# Patient Record
Sex: MALE | Race: WHITE | Employment: FULL TIME | URBAN - NONMETROPOLITAN AREA
[De-identification: names, ages, dates, MRNs, and addresses within clinical notes are randomized per-mention and may not be internally consistent; named-entity substitution may affect disease eponyms.]

---

## 2018-02-23 ENCOUNTER — INITIAL CONSULT (OUTPATIENT)
Dept: PULMONOLOGY | Age: 28
End: 2018-02-23
Payer: COMMERCIAL

## 2018-02-23 VITALS
OXYGEN SATURATION: 98 % | DIASTOLIC BLOOD PRESSURE: 82 MMHG | RESPIRATION RATE: 18 BRPM | SYSTOLIC BLOOD PRESSURE: 124 MMHG | HEIGHT: 69 IN | WEIGHT: 278 LBS | BODY MASS INDEX: 41.18 KG/M2 | HEART RATE: 88 BPM

## 2018-02-23 DIAGNOSIS — Z99.89 OSA ON CPAP: Primary | ICD-10-CM

## 2018-02-23 DIAGNOSIS — G47.33 OSA ON CPAP: Primary | ICD-10-CM

## 2018-02-23 PROBLEM — G47.30 SLEEP APNEA: Status: ACTIVE | Noted: 2018-02-23

## 2018-02-23 PROCEDURE — G8484 FLU IMMUNIZE NO ADMIN: HCPCS | Performed by: INTERNAL MEDICINE

## 2018-02-23 PROCEDURE — 99203 OFFICE O/P NEW LOW 30 MIN: CPT | Performed by: INTERNAL MEDICINE

## 2018-02-23 PROCEDURE — 1036F TOBACCO NON-USER: CPT | Performed by: INTERNAL MEDICINE

## 2018-02-23 PROCEDURE — G8417 CALC BMI ABV UP PARAM F/U: HCPCS | Performed by: INTERNAL MEDICINE

## 2018-02-23 PROCEDURE — G8427 DOCREV CUR MEDS BY ELIG CLIN: HCPCS | Performed by: INTERNAL MEDICINE

## 2018-02-23 NOTE — PROGRESS NOTES
Chief Complaint: Sleep Apnea-  Mallampati airway Class:{Jae # [id number]}  Neck Circumference: Inches    [place] sleepiness score 2/23/18:
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
## 2018-03-26 ENCOUNTER — TELEPHONE (OUTPATIENT)
Dept: PULMONOLOGY | Age: 28
End: 2018-03-26

## 2018-03-26 DIAGNOSIS — Z99.89 OSA ON CPAP: Primary | ICD-10-CM

## 2018-03-26 DIAGNOSIS — G47.33 OSA ON CPAP: Primary | ICD-10-CM

## 2018-03-26 NOTE — TELEPHONE ENCOUNTER
Patient's wife called checking on patient's records from [place]. Records have been received. Wife is now wondering if they can get an order for a new cpap machine as his is still not functioning properly. If so they would like it sent to [facility].

## 2018-04-11 ENCOUNTER — TELEPHONE (OUTPATIENT)
Dept: PULMONOLOGY | Age: 28
End: 2018-04-11

## 2018-04-11 DIAGNOSIS — G47.30 SLEEP APNEA, UNSPECIFIED TYPE: Primary | ICD-10-CM

## 2018-04-30 ENCOUNTER — HOSPITAL ENCOUNTER (OUTPATIENT)
Dept: SLEEP CENTER | Age: 28
Discharge: HOME | End: 2018-05-02
Payer: COMMERCIAL

## 2018-04-30 DIAGNOSIS — G47.30 SLEEP APNEA, UNSPECIFIED TYPE: ICD-10-CM

## 2018-04-30 PROCEDURE — 95811 POLYSOM 6/>YRS CPAP 4/> PARM: CPT

## 2018-05-01 DIAGNOSIS — G47.33 OSA ON CPAP: Primary | ICD-10-CM

## 2018-05-01 DIAGNOSIS — Z99.89 OSA ON CPAP: Primary | ICD-10-CM

## 2018-05-01 LAB — STATUS: NORMAL

## 2018-05-02 ENCOUNTER — TELEPHONE (OUTPATIENT)
Dept: SLEEP CENTER | Age: 28
End: 2018-05-02

## 2018-08-01 ENCOUNTER — OFFICE VISIT (OUTPATIENT)
Dept: PULMONOLOGY | Age: 28
End: 2018-08-01
Payer: COMMERCIAL

## 2018-08-01 VITALS
BODY MASS INDEX: 37.23 KG/M2 | HEIGHT: 69 IN | OXYGEN SATURATION: 97 % | WEIGHT: 251.4 LBS | HEART RATE: 75 BPM | RESPIRATION RATE: 14 BRPM | SYSTOLIC BLOOD PRESSURE: 104 MMHG | DIASTOLIC BLOOD PRESSURE: 78 MMHG

## 2018-08-01 DIAGNOSIS — Z99.89 OSA ON CPAP: Primary | ICD-10-CM

## 2018-08-01 DIAGNOSIS — G47.33 OSA ON CPAP: Primary | ICD-10-CM

## 2018-08-01 PROCEDURE — 99213 OFFICE O/P EST LOW 20 MIN: CPT | Performed by: INTERNAL MEDICINE

## 2018-08-01 PROCEDURE — 1036F TOBACCO NON-USER: CPT | Performed by: INTERNAL MEDICINE

## 2018-08-01 PROCEDURE — G8417 CALC BMI ABV UP PARAM F/U: HCPCS | Performed by: INTERNAL MEDICINE

## 2018-08-01 PROCEDURE — G8428 CUR MEDS NOT DOCUMENT: HCPCS | Performed by: INTERNAL MEDICINE

## 2018-08-01 NOTE — PROGRESS NOTES
Sleep Medicine clinic  Follow up for Sleep Apnea    Lizandro Cobian                                                Chief Complaint:  Claudia Yung is here for a 6-8 week follow up for Obstructive sleep apnea with a download. [place]: Lizandro Cobian is a 33 y.o.oldmale came for follow up regarding his sleep apnea. He is currently using his positive airway pressure device with a CPAP pressure of 13cm H20. He denies any problems with machine or mask. He is using a full face mask. He is sleeping well at night with out difficulty. He denies any daytime sleepiness. He is currently working as a  at [employer]. Review of Systems   General/Constitutional: he lost 27 lbs of weight from the last visit with normal appetite. No fever or chills. HENT: Negative. Eyes: Negative. Upper respiratory tract: No nasal stuffiness or post nasal drip. Lower respiratory tract/ lungs: No cough or sputum production. No hemoptysis. Cardiovascular: No palpitations or chest pain. Gastrointestinal: No nausea or vomiting. Neurological: No focal neurologiacal weakness. Extremities: No edema. Musculoskeletal: No complaints. Genitourinary: No complaints. Hematological: Negative. Psychiatric/Behavioral: Negative. Skin: No itching. Past Medical History:   Diagnosis Date    Sleep apnea        Past Surgical History:   Procedure Laterality Date    NASAL SEPTUM SURGERY  2012       Social History   Substance Use Topics    Smoking status: Former Smoker     Types: Cigars    Smokeless tobacco: Former User     Types: Snuff, Chew     Quit date: 2013      Comment: will smoke 1-2 cigars a yr.      Alcohol use Yes      Comment: rarely       Allergies   Allergen Reactions    Ceclor [Cefaclor]        Current Outpatient Prescriptions   Medication Sig Dispense Refill    NONFORMULARY All natural allergy medicine      CPAP Machine MISC by Does not apply route -Will change his current auto CPAP settings to minimum of results and clinical improvement.  -Follow with my clinic in 16months for clinical reevaluation with review of download. -He was advised to call [employer] company regarding supplies if needed. -He was advised to call my office for earlier appointment if needed for worsening of sleep symptoms.  -He was advised to loose weight by controlling diet and doing exercise once cleared by his family physician.   -Joanne Villanueva was educated about my impression and plan. Patient verbalizes understanding.

## 2018-08-01 NOTE — PROGRESS NOTES
Chief Complaint:  Severo Chin is here for a 6-8 week follow up for Obstructive sleep apnea with a download. Mallampati airway Class:  IV  Neck Circumference:  17.5 Inches    [place] sleepiness score 8/1/18:  4. Diagnostic Data: 4-30-18   AHI 66.7  PAP Download:    Recorded compliance dates:  6-20-18 -  7-19-18  More than 4hour usage compliance was:  100%.   Average residual Apnea- Hypoapnea index on current pressue was:0.6       PAP Type C PAP   Level  13 cmH20     Average usuage hours per day was: 6 hrs 58 min    Interface:  FFM    Provider:  [x][facility]  [][facility]  [][facility]  [][facility]         [][facility] []Other:

## 2019-05-16 ENCOUNTER — TELEPHONE (OUTPATIENT)
Dept: PULMONOLOGY | Age: 29
End: 2019-05-16

## 2019-05-16 DIAGNOSIS — G47.33 OSA ON CPAP: Primary | ICD-10-CM

## 2019-05-16 DIAGNOSIS — Z99.89 OSA ON CPAP: Primary | ICD-10-CM

## 2019-05-16 NOTE — TELEPHONE ENCOUNTER
Pressure change order has been given to [facility] here in the office.  Patient has been notified of pressure change and has been scheduled to come back in the office 06/05/19 @1pm

## 2019-06-06 ENCOUNTER — OFFICE VISIT (OUTPATIENT)
Dept: PULMONOLOGY | Age: 29
End: 2019-06-06
Payer: COMMERCIAL

## 2019-06-06 VITALS
HEART RATE: 84 BPM | OXYGEN SATURATION: 98 % | HEIGHT: 69 IN | BODY MASS INDEX: 33.44 KG/M2 | WEIGHT: 225.8 LBS | DIASTOLIC BLOOD PRESSURE: 74 MMHG | SYSTOLIC BLOOD PRESSURE: 118 MMHG

## 2019-06-06 DIAGNOSIS — Z99.89 OSA ON CPAP: Primary | ICD-10-CM

## 2019-06-06 DIAGNOSIS — G47.33 OSA ON CPAP: Primary | ICD-10-CM

## 2019-06-06 PROCEDURE — 1036F TOBACCO NON-USER: CPT | Performed by: NURSE PRACTITIONER

## 2019-06-06 PROCEDURE — 99213 OFFICE O/P EST LOW 20 MIN: CPT | Performed by: NURSE PRACTITIONER

## 2019-06-06 PROCEDURE — G8427 DOCREV CUR MEDS BY ELIG CLIN: HCPCS | Performed by: NURSE PRACTITIONER

## 2019-06-06 PROCEDURE — G8417 CALC BMI ABV UP PARAM F/U: HCPCS | Performed by: NURSE PRACTITIONER

## 2019-06-06 NOTE — PROGRESS NOTES
Vanessa Mins         [id number]  6/6/2019   Chief Complaint   Patient presents with    Follow-up     JOHN  2 week follow up with pressure change. Pt of Dr. Shalonda Lopez     PAP Download:   Original or initial AHI: 85.44     Date of initial study: 7/24/12  Weight of initial study: 280 lb Repeat titration 4/30/19 AHI 66.7 Weight 278 Lb   [x] Compliant  100%   []  Noncompliant 0%     PAP Type airsense 10 Level  11 cmh2o    Avg Hrs/Day 6:40  AHI: 1.0   Recorded compliance dates,4/30/19-5/29/19 Machine/Mfg: resmed  Interface: ffm    Provider:  [x][facility]  [][facility]  [][facility]  [][facility]         [][facility] []Other:     Neck Size: 17.5  Mallampati Mallampati 4  ESS:  3  SAQLI 88  Here is a scan of the most recent download:        Presentation:   Onofre Lezama presents for sleep medicine follow up for obstructive sleep apnea  Since the last visit, Onofre Lezama required a decrease in pressure from 13 to 11 cwp. He has lost 60 lbs intentionally. He was developing gas distention at 13. His pressure was changed mid May and he presents today doing markedly better. He does not plan to lose much more weight. Equipment issues: The pressure is  acceptable, the mask is acceptable and he  is  using the humidity. Sleep issues:  Do you feel better? Yes  More rested? Yes   Better concentration? yes    Progress History:   Since last visit any new medical issues? No  New ER or hospitlal visits? No  Any new or changes in medicines? No  Any new sleep medicines? No      Past Medical History:   Diagnosis Date    Sleep apnea        Past Surgical History:   Procedure Laterality Date    NASAL SEPTUM SURGERY  2012       Social History     Tobacco Use    Smoking status: Former Smoker     Types: Cigars    Smokeless tobacco: Former User     Types: Snuff, Chew     Quit date: 2013    Tobacco comment: will smoke 1-2 cigars a yr.     Substance Use Topics    Alcohol use: Yes     Comment: rarely    Drug use: No       Allergies   Allergen Reactions    Ignacia [Cefaclor]        Current Outpatient Medications   Medication Sig Dispense Refill    CPAP Machine MISC by Does not apply route Please decrease his CPAP pressure to 11cm H20. 1 each 0    NONFORMULARY All natural allergy medicine      CPAP Machine MISC by Does not apply route -Will change his current auto CPAP settings to minimum of 10cm H20 and maximum of 16cm H20. 1 each 0     No current facility-administered medications for this visit. No family history on file. Review of Systems   General/Constitutional: Intentional loss of weight over the last year, no appetite changes. No fever or chills. HENT: Negative. Eyes: Negative. Upper respiratory tract: No nasal stuffiness or post nasal drip. Lower respiratory tract/ lungs: No cough or sputum production. No hemoptysis. Cardiovascular: No palpitations or chest pain. Gastrointestinal: No nausea or vomiting. Neurological: No focal neurologiacal weakness. Extremities: No edema. Musculoskeletal: No complaints. Genitourinary: No complaints. Hematological: Negative. Psychiatric/Behavioral: Negative. Skin: No itching. Physical Exam:    BMI:  Body mass index is 33.34 kg/m². Wt Readings from Last 3 Encounters:   06/06/19 225 lb 12.8 oz (102.4 kg)   08/01/18 251 lb 6.4 oz (114 kg)   02/23/18 278 lb (126.1 kg)     Weight lost 53 lbs since sleep study (60 lbs over the last year)  Vitals: /74 (Site: Left Upper Arm, Position: Sitting, Cuff Size: Large Adult)   Pulse 84   Ht 5' 9\" (1.753 m)   Wt 225 lb 12.8 oz (102.4 kg)   SpO2 98% Comment: on RA  BMI 33.34 kg/m²         General Appearance Moderately built, moderately nourished, in no acute distress. HEENT - Head is normocephalic, atraumatic. PERRL. Oral mucosa pink and moist, no oral thrush. Mallampati Score - II (soft palate, uvula, fauces visible). Neck - Supple, symmetrical, trachea midline and soft.   Lungs - Chest symmetric with normal A/P diameter, normal respiratory rate and rhythm, lungs clear to auscultation, no wheezes, rales or rhonchi, aeration good. Cardiovascular - Heart sounds are normal. Regular rhythm normal rate without murmur, gallop or rub. Abdomen - Soft, nontender, non-distended. Neurologic - Alert and oriented x 3. Skin - No bruising or bleeding. Extremities - No cyanosis, clubbing or edema. ASSESSMENT/DIAGNOSIS     Diagnosis Orders   1. JOHN on CPAP              Plan   Do you need any equipment today? No. He has no intention for any further significant weight loss, therefore will continue at 11 cwp. We discussed option for APAP if he does continue to lose. He will call with any change in symptoms.    - He  was advised to continue current positive airway pressure therapy with above described pressure. - He  advised to keep goodcompliance with current recommended pressure to get optimal results and clinical improvement.  - Recommend 7-9 hours of sleep with PAP treatment. - He was advised to call [employer] company regarding supplies if needed.   -He call my office for earlier appointment if needed for worsening of sleep symptoms.   - He was instructed on weight loss. Brett Jensen was educated about my impression and plan. Patient verbalizes understanding. We will see Maurice Bermeo back in: 1 year with download.     Electronically signed by MIK Grewal CNP on 6/6/2019 at 1:32 PM

## 2019-06-20 ENCOUNTER — APPOINTMENT (OUTPATIENT)
Dept: GENERAL RADIOLOGY | Age: 29
End: 2019-06-20
Payer: COMMERCIAL

## 2019-06-20 ENCOUNTER — HOSPITAL ENCOUNTER (EMERGENCY)
Age: 29
Discharge: HOME | End: 2019-06-20
Payer: COMMERCIAL

## 2019-06-20 VITALS
OXYGEN SATURATION: 98 % | TEMPERATURE: 98 F | HEIGHT: 69 IN | HEART RATE: 80 BPM | DIASTOLIC BLOOD PRESSURE: 76 MMHG | SYSTOLIC BLOOD PRESSURE: 121 MMHG | WEIGHT: 220 LBS | BODY MASS INDEX: 32.58 KG/M2 | RESPIRATION RATE: 18 BRPM

## 2019-06-20 DIAGNOSIS — R07.9 CHEST PAIN, UNSPECIFIED TYPE: Primary | ICD-10-CM

## 2019-06-20 LAB
ANION GAP SERPL CALCULATED.3IONS-SCNC: 15 MEQ/L (ref 8–16)
BASOPHILS # BLD: 0.5 %
BASOPHILS ABSOLUTE: 0 THOU/MM3 (ref 0–0.1)
BUN BLDV-MCNC: 10 MG/DL (ref 7–22)
CALCIUM SERPL-MCNC: 10.4 MG/DL (ref 8.5–10.5)
CHLORIDE BLD-SCNC: 98 MEQ/L (ref 98–111)
CO2: 28 MEQ/L (ref 23–33)
CREAT SERPL-MCNC: 0.7 MG/DL (ref 0.4–1.2)
EKG ATRIAL RATE: 83 BPM
EKG P AXIS: 67 DEGREES
EKG P-R INTERVAL: 164 MS
EKG Q-T INTERVAL: 356 MS
EKG QRS DURATION: 94 MS
EKG QTC CALCULATION (BAZETT): 410 MS
EKG R AXIS: 62 DEGREES
EKG T AXIS: 52 DEGREES
EKG VENTRICULAR RATE: 80 BPM
EOSINOPHIL # BLD: 1.3 %
EOSINOPHILS ABSOLUTE: 0.1 THOU/MM3 (ref 0–0.4)
ERYTHROCYTE [DISTWIDTH] IN BLOOD BY AUTOMATED COUNT: 12.3 % (ref 11.5–14.5)
ERYTHROCYTE [DISTWIDTH] IN BLOOD BY AUTOMATED COUNT: 38.7 FL (ref 35–45)
GFR SERPL CREATININE-BSD FRML MDRD: > 90 ML/MIN/1.73M2
GLUCOSE BLD-MCNC: 99 MG/DL (ref 70–108)
HCT VFR BLD CALC: 44.5 % (ref 42–52)
HEMOGLOBIN: 15.3 GM/DL (ref 14–18)
IMMATURE GRANS (ABS): 0.01 THOU/MM3 (ref 0–0.07)
IMMATURE GRANULOCYTES: 0.2 %
LYMPHOCYTES # BLD: 48.1 %
LYMPHOCYTES ABSOLUTE: 2.8 THOU/MM3 (ref 1–4.8)
MAGNESIUM: 2 MG/DL (ref 1.6–2.4)
MCH RBC QN AUTO: 29.6 PG (ref 26–33)
MCHC RBC AUTO-ENTMCNC: 34.4 GM/DL (ref 32.2–35.5)
MCV RBC AUTO: 86.1 FL (ref 80–94)
MONOCYTES # BLD: 9.4 %
MONOCYTES ABSOLUTE: 0.6 THOU/MM3 (ref 0.4–1.3)
NUCLEATED RED BLOOD CELLS: 0 /100 WBC
OSMOLALITY CALCULATION: 280.3 MOSMOL/KG (ref 275–300)
PLATELET # BLD: 257 THOU/MM3 (ref 130–400)
PMV BLD AUTO: 9.1 FL (ref 9.4–12.4)
POTASSIUM SERPL-SCNC: 4 MEQ/L (ref 3.5–5.2)
RBC # BLD: 5.17 MILL/MM3 (ref 4.7–6.1)
SEG NEUTROPHILS: 40.5 %
SEGMENTED NEUTROPHILS ABSOLUTE COUNT: 2.4 THOU/MM3 (ref 1.8–7.7)
SODIUM BLD-SCNC: 141 MEQ/L (ref 135–145)
TROPONIN T: < 0.01 NG/ML
TROPONIN T: < 0.01 NG/ML
WBC # BLD: 5.9 THOU/MM3 (ref 4.8–10.8)

## 2019-06-20 PROCEDURE — 84484 ASSAY OF TROPONIN QUANT: CPT

## 2019-06-20 PROCEDURE — 85025 COMPLETE CBC W/AUTO DIFF WBC: CPT

## 2019-06-20 PROCEDURE — 93005 ELECTROCARDIOGRAM TRACING: CPT | Performed by: PHYSICIAN ASSISTANT

## 2019-06-20 PROCEDURE — 71046 X-RAY EXAM CHEST 2 VIEWS: CPT

## 2019-06-20 PROCEDURE — 99285 EMERGENCY DEPT VISIT HI MDM: CPT

## 2019-06-20 PROCEDURE — 80048 BASIC METABOLIC PNL TOTAL CA: CPT

## 2019-06-20 PROCEDURE — 83735 ASSAY OF MAGNESIUM: CPT

## 2019-06-20 PROCEDURE — 93010 ELECTROCARDIOGRAM REPORT: CPT | Performed by: INTERNAL MEDICINE

## 2019-06-20 PROCEDURE — 36415 COLL VENOUS BLD VENIPUNCTURE: CPT

## 2019-06-20 ASSESSMENT — PAIN DESCRIPTION - PAIN TYPE: TYPE: ACUTE PAIN

## 2019-06-20 ASSESSMENT — PAIN DESCRIPTION - LOCATION: LOCATION: CHEST

## 2019-06-20 ASSESSMENT — ENCOUNTER SYMPTOMS
VOMITING: 0
WHEEZING: 0
NAUSEA: 0
SHORTNESS OF BREATH: 0
EYE REDNESS: 0
BACK PAIN: 0
RHINORRHEA: 0
ABDOMINAL PAIN: 0
COUGH: 0
EYE DISCHARGE: 0
DIARRHEA: 0
SORE THROAT: 0

## 2019-06-20 ASSESSMENT — PAIN SCALES - GENERAL: PAINLEVEL_OUTOF10: 2

## 2019-06-20 ASSESSMENT — PAIN DESCRIPTION - FREQUENCY: FREQUENCY: CONTINUOUS

## 2019-06-20 ASSESSMENT — PAIN DESCRIPTION - DESCRIPTORS: DESCRIPTORS: TIGHTNESS

## 2019-06-20 NOTE — ED NOTES
Patient resting quietly in cot showing no signs of distress at this time. Rates chest tightness 1/10. Updated on POC. Will continue to monitor.       Jonathan Gonzales RN  06/20/19 1725

## 2019-06-20 NOTE — ED PROVIDER NOTES
[facility]  eMERGENCY dEPARTMENT eNCOUnter          CHIEF COMPLAINT       Chief Complaint   Patient presents with    Chest Pain       Nurses Notes reviewed and I agree except as noted in the HPI. HISTORY OF PRESENT ILLNESS    Tony Ragsdale is a 26 y.o. male who presents to the Emergency Department for the evaluation of chest pain which the patient reports became present around 0730 this morning. The patient states, \"It feels like a muscle cramp\". He rates his current pain as a 2/10 in severity and aching in character. No radicular pain is noted. Patient denies any diaphoresis, nausea, vomiting, or shortness of breath associated with his chest pain. Patient admits to have recently been under an increased amount of stress due to his wife experiencing a miscarriage 2 months ago and father's recent relapse on alcohol. He reports that he is currently undergoing counseling due to his stress and denies any suicidal or homicidal ideations. Patient denied consultation with GARRETT in the ED. The patient reports no additional symptoms or complaints at this time. The HPI was provided by the patient. REVIEW OF SYSTEMS     Review of Systems   Constitutional: Negative for appetite change, chills, fatigue and fever. HENT: Negative for congestion, ear pain, rhinorrhea and sore throat. Eyes: Negative for discharge, redness and visual disturbance. Respiratory: Negative for cough, shortness of breath and wheezing. Cardiovascular: Positive for chest pain. Negative for palpitations and leg swelling. Gastrointestinal: Negative for abdominal pain, diarrhea, nausea and vomiting. Genitourinary: Negative for decreased urine volume, difficulty urinating, dysuria and hematuria. Musculoskeletal: Negative for arthralgias, back pain, joint swelling and neck pain. Skin: Negative for pallor and rash. Allergic/Immunologic: Negative for environmental allergies.    Neurological: Negative for dizziness, syncope, weakness, light-headedness, numbness and headaches. Hematological: Negative for adenopathy. Psychiatric/Behavioral: Negative for confusion and suicidal ideas. The patient is nervous/anxious. PAST MEDICAL HISTORY    has a past medical history of Sleep apnea. SURGICAL HISTORY      has a past surgical history that includes Nasal septum surgery (2012) and Nasal sinus surgery. CURRENT MEDICATIONS       Discharge Medication List as of 6/20/2019  3:08 PM      CONTINUE these medications which have NOT CHANGED    Details   ! ! CPAP Machine MISC Starting Thu 5/16/2019, Disp-1 each, R-0, PrintPlease decrease his CPAP pressure to 11cm H20.      NONFORMULARY All natural allergy medicineHistorical Med      !! CPAP Machine MISC Starting Fri 2/23/2018, Disp-1 each, R-0, Print-Will change his current auto CPAP settings to minimum of 10cm H20 and maximum of 16cm H20.       !! - Potential duplicate medications found. Please discuss with provider. ALLERGIES     is allergic to ceclor [cefaclor]. FAMILY HISTORY     has no family status information on file. family history is not on file. SOCIAL HISTORY      reports that he has quit smoking. His smoking use included cigars. He quit smokeless tobacco use about 6 years ago. His smokeless tobacco use included snuff and chew. He reports that he drinks alcohol. He reports that he does not use drugs. PHYSICAL EXAM     INITIAL VITALS:  height is 5' 9\" (1.753 m) and weight is 220 lb (99.8 kg). His oral temperature is 98 °F (36.7 °C). His blood pressure is 121/76 and his pulse is 80. His respiration is 18 and oxygen saturation is 98%. Physical Exam   Constitutional: He is oriented to person, place, and time. He appears well-developed and well-nourished. Non-toxic appearance. HENT:   Head: Normocephalic and atraumatic.    Right Ear: Tympanic membrane and external ear normal.   Left Ear: Tympanic membrane and external ear normal.   Nose: Nose normal. Mouth/Throat: Oropharynx is clear and moist and mucous membranes are normal. No oropharyngeal exudate, posterior oropharyngeal edema or posterior oropharyngeal erythema. Eyes: Conjunctivae and EOM are normal.   Neck: Normal range of motion. Neck supple. No JVD present. Cardiovascular: Normal rate, regular rhythm, normal heart sounds, intact distal pulses and normal pulses. Exam reveals no gallop and no friction rub. No murmur heard. Pulmonary/Chest: Effort normal and breath sounds normal. No respiratory distress. He has no decreased breath sounds. He has no wheezes. He has no rhonchi. He has no rales. Abdominal: Soft. Bowel sounds are normal. He exhibits no distension. There is no tenderness. There is no rebound, no guarding and no CVA tenderness. Musculoskeletal: Normal range of motion. He exhibits no edema. Neurological: He is alert and oriented to person, place, and time. He exhibits normal muscle tone. Coordination normal.   Skin: Skin is warm and dry. No rash noted. He is not diaphoretic. Psychiatric: His mood appears anxious. Nursing note and vitals reviewed. DIFFERENTIALDIAGNOSIS:   Bronchitis, Pneumonia, ACS, PE, Musculoskeletal pain, pleuritic pain    DIAGNOSTIC RESULTS     EKG: All EKG's are interpreted by the Emergency Department Physician who either signs or Co-signs this chart in the absence of a cardiologist.    Maegan Hunter. Rate: 80 bpm  WY interval: 164 ms  QRS duration: 94 ms  QTc: 410 ms  P-R-T axes: 67, 62, 52  No STEMI. EKG gives impression of NSR with sinus arrhythmia    No previous EKG is available for comparison. RADIOLOGY: non-plain film images(s) such as CT, Ultrasound and MRI are read by the radiologist.    XR CHEST STANDARD (2 VW)   Final Result   Normal chest. No acute findings. **This report has been created using voice recognition software. It may contain minor errors which are inherent in voice recognition technology. **      Final report electronically signed by Dr. Festus Frausto on 6/20/2019 11:40 AM          LABS:   Labs Reviewed   CBC WITH AUTO DIFFERENTIAL - Abnormal; Notable for the following components:       Result Value    MPV 9.1 (*)     All other components within normal limits   BASIC METABOLIC PANEL   TROPONIN   MAGNESIUM   ANION GAP   GLOMERULAR FILTRATION RATE, ESTIMATED   OSMOLALITY   TROPONIN       EMERGENCY DEPARTMENT COURSE:   Vitals:    Vitals:    06/20/19 1101 06/20/19 1254 06/20/19 1417 06/20/19 1446   BP: (!) 144/93 111/70 114/65 121/76   Pulse: 78 82 77 80   Resp: 18 18 18 18   Temp: 98 °F (36.7 °C)      TempSrc: Oral      SpO2: 100% 99% 99% 98%   Weight: 220 lb (99.8 kg)      Height: 5' 9\" (1.753 m)          11:19 AM: The patient was seen and evaluated. MDM:  The patient was seen and evaluated within the ED today for the evaluation of chest pain. The patient presented in no acute distress. Physical exam revealed no significant abnormalities or concerns. Repeat Troponins are 0.010. Standard chest XR reveals no acute findings. EKG is normal.  I observed the patient's condition to remain stable during the duration of his stay. He was amenable to my decision for discharge and was sent home in stable condition. I discussed return precautions and he verbalized understanding. I recommended that he f/u with cardiology tomorrow for further evaluation and work up. All questions and concerns were addressed. CRITICAL CARE:   None     CONSULTS:  None    PROCEDURES:  None     FINAL IMPRESSION      1.  Chest pain, unspecified type          DISPOSITION/PLAN   Discharged    PATIENT REFERRED TO:  [facility] [address] [place] [address]  [phone]  In 1 day        DISCHARGE MEDICATIONS:  Discharge Medication List as of 6/20/2019  3:08 PM          (Please note that portions of this note were completed with a voice recognition program.  Efforts were made to edit the dictations butoccasionally words are

## 2019-06-20 NOTE — ED NOTES
Patient resting quietly in cot showing no signs of distress at this time. Rates chest tightness 1/10. Updated on POC. Will continue to monitor.       Chante Moise RN  06/20/19 0230

## 2019-09-17 ENCOUNTER — OFFICE VISIT (OUTPATIENT)
Dept: PSYCHOLOGY | Age: 29
End: 2019-09-17
Payer: COMMERCIAL

## 2019-09-17 DIAGNOSIS — F42.2 MIXED OBSESSIONAL THOUGHTS AND ACTS: Primary | ICD-10-CM

## 2019-09-17 PROCEDURE — 90791 PSYCH DIAGNOSTIC EVALUATION: CPT | Performed by: PSYCHOLOGIST

## 2019-09-17 SDOH — SOCIAL STABILITY: SOCIAL INSECURITY: WITHIN THE LAST YEAR, HAVE YOU BEEN HUMILIATED OR EMOTIONALLY ABUSED IN OTHER WAYS BY YOUR PARTNER OR EX-PARTNER?: NO

## 2019-09-17 SDOH — ECONOMIC STABILITY: INCOME INSECURITY: HOW HARD IS IT FOR YOU TO PAY FOR THE VERY BASICS LIKE FOOD, HOUSING, MEDICAL CARE, AND HEATING?: NOT HARD AT ALL

## 2019-09-17 SDOH — SOCIAL STABILITY: SOCIAL INSECURITY: WITHIN THE LAST YEAR, HAVE YOU BEEN AFRAID OF YOUR PARTNER OR EX-PARTNER?: NO

## 2019-09-17 SDOH — HEALTH STABILITY: MENTAL HEALTH: HOW MANY STANDARD DRINKS CONTAINING ALCOHOL DO YOU HAVE ON A TYPICAL DAY?: 1 OR 2

## 2019-09-17 SDOH — SOCIAL STABILITY: SOCIAL NETWORK: ARE YOU MARRIED, WIDOWED, DIVORCED, SEPARATED, NEVER MARRIED, OR LIVING WITH A PARTNER?: MARRIED

## 2019-09-17 SDOH — SOCIAL STABILITY: SOCIAL NETWORK
IN A TYPICAL WEEK, HOW MANY TIMES DO YOU TALK ON THE PHONE WITH FAMILY, FRIENDS, OR NEIGHBORS?: MORE THAN THREE TIMES A WEEK

## 2019-09-17 SDOH — SOCIAL STABILITY: SOCIAL NETWORK: HOW OFTEN DO YOU ATTENT MEETINGS OF THE CLUB OR ORGANIZATION YOU BELONG TO?: MORE THAN 4 TIMES PER YEAR

## 2019-09-17 SDOH — ECONOMIC STABILITY: TRANSPORTATION INSECURITY
IN THE PAST 12 MONTHS, HAS THE LACK OF TRANSPORTATION KEPT YOU FROM MEDICAL APPOINTMENTS OR FROM GETTING MEDICATIONS?: NO

## 2019-09-17 SDOH — ECONOMIC STABILITY: TRANSPORTATION INSECURITY
IN THE PAST 12 MONTHS, HAS LACK OF TRANSPORTATION KEPT YOU FROM MEETINGS, WORK, OR FROM GETTING THINGS NEEDED FOR DAILY LIVING?: NO

## 2019-09-17 SDOH — HEALTH STABILITY: PHYSICAL HEALTH: ON AVERAGE, HOW MANY DAYS PER WEEK DO YOU ENGAGE IN MODERATE TO STRENUOUS EXERCISE (LIKE A BRISK WALK)?: 2 DAYS

## 2019-09-17 SDOH — HEALTH STABILITY: PHYSICAL HEALTH: ON AVERAGE, HOW MANY MINUTES DO YOU ENGAGE IN EXERCISE AT THIS LEVEL?: 30 MIN

## 2019-09-17 SDOH — HEALTH STABILITY: MENTAL HEALTH: HOW OFTEN DO YOU HAVE A DRINK CONTAINING ALCOHOL?: MONTHLY OR LESS

## 2019-09-17 SDOH — SOCIAL STABILITY: SOCIAL INSECURITY
WITHIN THE LAST YEAR, HAVE TO BEEN RAPED OR FORCED TO HAVE ANY KIND OF SEXUAL ACTIVITY BY YOUR PARTNER OR EX-PARTNER?: NO

## 2019-09-17 SDOH — SOCIAL STABILITY: SOCIAL NETWORK
DO YOU BELONG TO ANY CLUBS OR ORGANIZATIONS SUCH AS CHURCH GROUPS UNIONS, FRATERNAL OR ATHLETIC GROUPS, OR SCHOOL GROUPS?: YES

## 2019-09-17 SDOH — SOCIAL STABILITY: SOCIAL INSECURITY
WITHIN THE LAST YEAR, HAVE YOU BEEN KICKED, HIT, SLAPPED, OR OTHERWISE PHYSICALLY HURT BY YOUR PARTNER OR EX-PARTNER?: NO

## 2019-09-17 SDOH — SOCIAL STABILITY: SOCIAL NETWORK: HOW OFTEN DO YOU ATTEND CHURCH OR RELIGIOUS SERVICES?: MORE THAN 4 TIMES PER YEAR

## 2019-09-24 ENCOUNTER — OFFICE VISIT (OUTPATIENT)
Dept: PSYCHOLOGY | Age: 29
End: 2019-09-24
Payer: COMMERCIAL

## 2019-09-24 DIAGNOSIS — F42.2 MIXED OBSESSIONAL THOUGHTS AND ACTS: Primary | ICD-10-CM

## 2019-09-24 PROCEDURE — 90837 PSYTX W PT 60 MINUTES: CPT | Performed by: PSYCHOLOGIST

## 2019-09-24 NOTE — PROGRESS NOTES
Behavioral Health Consultation  Juliette Maciel, PhD  Psychologist  9/24/2019  9:24 AM      Time spent with Patient:  60 minutes  This is patient's second  [facility] appointment. Reason for Consult:  anxiety  Referring Provider: No referring provider defined for this encounter. Pt provided informed consent for the behavioral health program. Discussed with patient model of service to include the limits of confidentiality (i.e. abuse reporting, suicide intervention, etc.) and short-term intervention focused approach. Pt indicated understanding. Feedback given to PCP. Description:    MSE:    Appearance    crying  Appetite normal  Sleep disturbance Yes  Loss of pleasure No  Speech    normal rate  Mood    Anxious  Affect    anxiety  Thought Content    intact  Insight    Fair  Judgment    Intact  Suicide Assessment    no suicidal ideation  Homicidal Assessment Intent No  Cutting No  Enuresis No  Learning Disorder none      History:    Medications:   Current Outpatient Medications   Medication Sig Dispense Refill    CPAP Machine MISC by Does not apply route Please decrease his CPAP pressure to 11cm H20. 1 each 0    NONFORMULARY All natural allergy medicine      CPAP Machine MISC by Does not apply route -Will change his current auto CPAP settings to minimum of 10cm H20 and maximum of 16cm H20. 1 each 0     No current facility-administered medications for this visit.         Social History:   Social History     Socioeconomic History    Marital status:      Spouse name: Not on file    Number of children: Not on file    Years of education: post high school    Highest education level: Not on file   Occupational History    Occupation:    Social Needs    Financial resource strain: Not hard at all   Deja-Azam insecurity:     Worry: Not on file     Inability: Not on file    Transportation needs:     Medical: No     Non-medical: No   Tobacco Use    Smoking status: Former Smoker     Types: Cigars    Smokeless tobacco: Former User     Types: Snuff, Tyshawn Dears     Quit date: 2013    Tobacco comment: will smoke 1-2 cigars a yr. Substance and Sexual Activity    Alcohol use: Yes     Frequency: Monthly or less     Drinks per session: 1 or 2     Binge frequency: Never     Comment: rarely    Drug use: Not on file    Sexual activity: Yes     Partners: Female   Lifestyle    Physical activity:     Days per week: 2 days     Minutes per session: 30 min    Stress: Not on file   Relationships    Social connections:     Talks on phone: More than three times a week     Gets together: Not on file     Attends Zoroastrianism service: More than 4 times per year     Active member of club or organization: Yes     Attends meetings of clubs or organizations: More than 4 times per year     Relationship status:     Intimate partner violence:     Fear of current or ex partner: No     Emotionally abused: No     Physically abused: No     Forced sexual activity: No   Other Topics Concern    Not on file   Social History Narrative    Has friends from the [facility]; moved here from [place], moved during month of September, 2019 to a new home in [facility] [address]:   reports that he has quit smoking. His smoking use included cigars. He quit smokeless tobacco use about 6 years ago. His smokeless tobacco use included snuff and chew. ETOH:   reports that he drinks alcohol. Family History:   No family history on file. Assessment:  He is having issues with his sleep and not getting enough. He is having problems with eating healthy and has started a new diet. His wants to improve his exercise. He is feeling shame and guilt and feels like he needs to control everything at home. He started to cry when talking about being lonely and not having a friend. He is a  and feels like he has no one to talk to. He is feeling guilty about his relationship with his wife. They are under a lot of stress. Diagnosis:    . Eddie Cardoso

## 2019-10-01 ENCOUNTER — OFFICE VISIT (OUTPATIENT)
Dept: PSYCHOLOGY | Age: 29
End: 2019-10-01
Payer: COMMERCIAL

## 2019-10-01 DIAGNOSIS — F42.2 MIXED OBSESSIONAL THOUGHTS AND ACTS: Primary | ICD-10-CM

## 2019-10-01 PROCEDURE — 90837 PSYTX W PT 60 MINUTES: CPT | Performed by: PSYCHOLOGIST

## 2019-10-08 ENCOUNTER — OFFICE VISIT (OUTPATIENT)
Dept: PSYCHOLOGY | Age: 29
End: 2019-10-08
Payer: COMMERCIAL

## 2019-10-08 DIAGNOSIS — F42.2 MIXED OBSESSIONAL THOUGHTS AND ACTS: ICD-10-CM

## 2019-10-08 DIAGNOSIS — F41.9 ANXIETY: Primary | ICD-10-CM

## 2019-10-08 PROCEDURE — 90837 PSYTX W PT 60 MINUTES: CPT | Performed by: PSYCHOLOGIST

## 2019-10-14 ENCOUNTER — OFFICE VISIT (OUTPATIENT)
Dept: PSYCHOLOGY | Age: 29
End: 2019-10-14
Payer: COMMERCIAL

## 2019-10-14 DIAGNOSIS — F42.2 MIXED OBSESSIONAL THOUGHTS AND ACTS: Primary | ICD-10-CM

## 2019-10-14 PROCEDURE — 90837 PSYTX W PT 60 MINUTES: CPT | Performed by: PSYCHOLOGIST

## 2019-10-22 ENCOUNTER — OFFICE VISIT (OUTPATIENT)
Dept: PSYCHOLOGY | Age: 29
End: 2019-10-22
Payer: COMMERCIAL

## 2019-10-22 DIAGNOSIS — F42.2 MIXED OBSESSIONAL THOUGHTS AND ACTS: Primary | ICD-10-CM

## 2019-10-22 PROCEDURE — 90837 PSYTX W PT 60 MINUTES: CPT | Performed by: PSYCHOLOGIST

## 2019-10-29 ENCOUNTER — OFFICE VISIT (OUTPATIENT)
Dept: PSYCHOLOGY | Age: 29
End: 2019-10-29
Payer: COMMERCIAL

## 2019-10-29 DIAGNOSIS — F42.2 MIXED OBSESSIONAL THOUGHTS AND ACTS: Primary | ICD-10-CM

## 2019-10-29 PROCEDURE — 90837 PSYTX W PT 60 MINUTES: CPT | Performed by: PSYCHOLOGIST

## 2019-11-05 ENCOUNTER — OFFICE VISIT (OUTPATIENT)
Dept: PSYCHOLOGY | Age: 29
End: 2019-11-05
Payer: COMMERCIAL

## 2019-11-05 DIAGNOSIS — F42.2 MIXED OBSESSIONAL THOUGHTS AND ACTS: Primary | ICD-10-CM

## 2019-11-05 PROCEDURE — 90837 PSYTX W PT 60 MINUTES: CPT | Performed by: PSYCHOLOGIST

## 2019-12-04 ENCOUNTER — OFFICE VISIT (OUTPATIENT)
Dept: PULMONOLOGY | Age: 29
End: 2019-12-04
Payer: COMMERCIAL

## 2019-12-04 VITALS
SYSTOLIC BLOOD PRESSURE: 124 MMHG | BODY MASS INDEX: 35.49 KG/M2 | HEART RATE: 83 BPM | WEIGHT: 239.6 LBS | OXYGEN SATURATION: 98 % | DIASTOLIC BLOOD PRESSURE: 62 MMHG | HEIGHT: 69 IN

## 2019-12-04 DIAGNOSIS — G47.33 OSA ON CPAP: Primary | ICD-10-CM

## 2019-12-04 DIAGNOSIS — Z99.89 OSA ON CPAP: Primary | ICD-10-CM

## 2019-12-04 PROCEDURE — 1036F TOBACCO NON-USER: CPT | Performed by: INTERNAL MEDICINE

## 2019-12-04 PROCEDURE — 99213 OFFICE O/P EST LOW 20 MIN: CPT | Performed by: INTERNAL MEDICINE

## 2019-12-04 PROCEDURE — G8427 DOCREV CUR MEDS BY ELIG CLIN: HCPCS | Performed by: INTERNAL MEDICINE

## 2019-12-04 PROCEDURE — G8417 CALC BMI ABV UP PARAM F/U: HCPCS | Performed by: INTERNAL MEDICINE

## 2019-12-04 PROCEDURE — G8484 FLU IMMUNIZE NO ADMIN: HCPCS | Performed by: INTERNAL MEDICINE

## 2019-12-04 RX ORDER — PAROXETINE HYDROCHLORIDE 20 MG/1
20 TABLET, FILM COATED ORAL NIGHTLY
COMMUNITY
End: 2021-11-30

## 2020-01-07 ENCOUNTER — OFFICE VISIT (OUTPATIENT)
Dept: PSYCHOLOGY | Age: 30
End: 2020-01-07
Payer: COMMERCIAL

## 2020-01-07 PROCEDURE — 90837 PSYTX W PT 60 MINUTES: CPT | Performed by: PSYCHOLOGIST

## 2020-01-07 NOTE — PROGRESS NOTES
his thought stopping methods; Affect: he was alert; somewhat anxious; made eye contact; stayed on topic; Behavior: discussed his busy schedule; discussed his pride in staying away from pornography; discussed how it is getting easier to be around women and not feel lust; Physiology: says he is feeling better; not exercising as much; still feels hungry because of medication but is resisting the temptation; motivated to change; making progress; feels therapy is helping; taking his medication. Diagnosis:    . Noe Hoof Diagnosis Orders   1. Mixed obsessional thoughts and acts             Plan:  Pt interventions:  Discussed and set plan for behavioral activation and continue to use thought stopping methods; continue to focus on the present.        Pt Behavioral Change Plan:

## 2020-01-20 ENCOUNTER — TELEPHONE (OUTPATIENT)
Dept: PULMONOLOGY | Age: 30
End: 2020-01-20

## 2020-01-20 NOTE — TELEPHONE ENCOUNTER
Patient called in stated he would like to try a Little  higher pressure by says he has gained a little weight back about 10-15 pounds. And hasn't been sleeping as well. Please advise.

## 2020-01-23 NOTE — TELEPHONE ENCOUNTER
I spoke with Megan Staff, he wishes not to see Cheyenne.  Scheduled with Dr. Patel Both on 4/24/20 @ 11 am.

## 2020-02-04 ENCOUNTER — OFFICE VISIT (OUTPATIENT)
Dept: PSYCHOLOGY | Age: 30
End: 2020-02-04
Payer: COMMERCIAL

## 2020-02-04 PROCEDURE — 90837 PSYTX W PT 60 MINUTES: CPT | Performed by: PSYCHOLOGIST

## 2020-02-04 NOTE — PROGRESS NOTES
Behavioral Health Consultation  Jenness Holstein, PhD  Psychologist  2/4/2020  9:55 AM      Time spent with Patient:  60 minutes  This is patient's ninth  [facility] appointment. Reason for Consult:  anxiety  Referring Provider: No referring provider defined for this encounter. Pt provided informed consent for the behavioral health program. Discussed with patient model of service to include the limits of confidentiality (i.e. abuse reporting, suicide intervention, etc.) and short-term intervention focused approach. Pt indicated understanding. Feedback given to PCP. Description:    MSE:    Appearance    cooperative  Appetite normal  Sleep disturbance No  Loss of pleasure No  Speech    normal rate  Mood    Peaceful   Affect    normal affect  Thought Content    intact  Insight    Good  Judgment    Intact  Suicide Assessment    no suicidal ideation  Homicidal Assessment Intent No  Cutting No  Enuresis No  Learning Disorder None      History:    Medications:   Current Outpatient Medications   Medication Sig Dispense Refill    CPAP Machine MISC by Does not apply route Please change CPAP pressure to 12cm H20. 1 each 0    PARoxetine (PAXIL) 20 MG tablet Take 20 mg by mouth nightly      CPAP Machine MISC by Does not apply route Please decrease his CPAP pressure to 11cm H20. 1 each 0    NONFORMULARY All natural allergy medicine      CPAP Machine MISC by Does not apply route -Will change his current auto CPAP settings to minimum of 10cm H20 and maximum of 16cm H20. 1 each 0     No current facility-administered medications for this visit.         Social History:   Social History     Socioeconomic History    Marital status:      Spouse name: Not on file    Number of children: Not on file    Years of education: post high school    Highest education level: Not on file   Occupational History    Occupation:    Social Needs    Financial resource strain: Not hard at all   Deja-Azam insecurity:     Worry: himself as getting better on sleep and eating; he rated himself as good on motivation, work, relationship and feelings and needs improvement on exercise; discussed Dr. Kavin Mendoza research on focusing on the little successes. He is doing very well so he has met his goals of therapy and his case will be closed; he will call if he needs anything. Diagnosis:    . Barbie Orr Diagnosis Orders   1. Mixed obsessional thoughts and acts             Plan:  Pt interventions:  He will work on focusing on the little exercises that he does well. His case will be closed successfully.        Pt Behavioral Change Plan:

## 2020-06-10 ENCOUNTER — TELEPHONE (OUTPATIENT)
Dept: PULMONOLOGY | Age: 30
End: 2020-06-10

## 2020-06-10 ENCOUNTER — OFFICE VISIT (OUTPATIENT)
Dept: PULMONOLOGY | Age: 30
End: 2020-06-10
Payer: COMMERCIAL

## 2020-06-10 VITALS
HEIGHT: 69 IN | DIASTOLIC BLOOD PRESSURE: 84 MMHG | SYSTOLIC BLOOD PRESSURE: 110 MMHG | OXYGEN SATURATION: 98 % | HEART RATE: 61 BPM | WEIGHT: 250 LBS | BODY MASS INDEX: 37.03 KG/M2 | TEMPERATURE: 96.6 F

## 2020-06-10 PROCEDURE — 99213 OFFICE O/P EST LOW 20 MIN: CPT | Performed by: INTERNAL MEDICINE

## 2020-06-10 PROCEDURE — G8427 DOCREV CUR MEDS BY ELIG CLIN: HCPCS | Performed by: INTERNAL MEDICINE

## 2020-06-10 PROCEDURE — 1036F TOBACCO NON-USER: CPT | Performed by: INTERNAL MEDICINE

## 2020-06-10 PROCEDURE — G8417 CALC BMI ABV UP PARAM F/U: HCPCS | Performed by: INTERNAL MEDICINE

## 2020-06-10 NOTE — PATIENT INSTRUCTIONS
Recommendations/Plan:  -He was advised to continue current positive airway pressure therapy with above described pressure.  -He advised to keep good compliance with current recommended pressure to get optimal results and clinical improvement.  -Follow with my clinic in 16months for clinical reevaluation with review of download. -He was advised to call [employer] regarding supplies if needed. -He was advised to call my office for earlier appointment if needed for worsening of sleep symptoms.  -He was advised to loose weight by controlling diet and doing exercise.  -He was advised to continue to practice good sleep hygiene techniques.   -Dalia Dodd was educated about my impression and plan. Patient verbalizes understanding.

## 2020-06-10 NOTE — PROGRESS NOTES
cervical adenopathy. Neurological: Patient is alert and oriented to person, place, and time. Skin: Skin is warm and dry. Patient is not diaphoretic. Psychiatric: Patient  has a normal mood and affect. Patient behavior is normal.       Diagnostic Data: 7/24/12   AHI: 85.44  PAP Download:   Recorded compliance dates:5/10/20-6/8/20  More than 4hour usage compliance was:100%. Average residual Apnea- Hypoapnea index on current pressue was:0.5       PAP Type cpap    Level  12      Average usuage hours per day was:7 hours 27 min  Interface: FFM      Provider:  [x]? [facility]             []?[facility]                        []?[facility]          []? [facility]                           []?[facility]      []? Other:        Assesment:  -Severe obstructive sleep apnea on treatment with a CPAP pressure of 12cm H20 - he is using her CPAP device with excellent compliance to > hours of therapy. His respiratory events were under good control with current therapy. His clinical symptoms improved. He is benefiting from current CPAP therapy and would like to continue the therapy.  -Allergic rhinitis- He uses Claritin on PRN basis. -Anxiety disorder on treatment with Paxil. He follows with his family physician. Recommendations/Plan:  -He was advised to continue current positive airway pressure therapy with above described pressure.  -He advised to keep good compliance with current recommended pressure to get optimal results and clinical improvement.  -Follow with my clinic in 16months for clinical reevaluation with review of download. -He was advised to call [employer] regarding supplies if needed. -He was advised to call my office for earlier appointment if needed for worsening of sleep symptoms.  -He was advised to loose weight by controlling diet and doing exercise.  -He was advised to continue to practice good sleep hygiene techniques.   -Marilee Flores was educated about my impression and plan.  Patient verbalizes understanding.

## 2020-06-10 NOTE — PROGRESS NOTES
Chief Complaint: JOHN with download     Mallampati airway Class: 3  Neck Circumference:.17  Inches    [place] sleepiness score 6/10/20:   Diagnostic Data:   PAP Download:   Recorded compliance dates:5/10/20-6/8/20  More than 4hour usage compliance was:100%.   Average residual Apnea- Hypoapnea index on current pressue was:0.5      PAP Type cpap    Level  12     Average usuage hours per day was:7 hours 27 min  Interface: FFM     Provider:  [x][facility]  []Jeannie  [][facility]  []Julio         [][facility] []Other:

## 2021-11-30 ENCOUNTER — OFFICE VISIT (OUTPATIENT)
Dept: PULMONOLOGY | Age: 31
End: 2021-11-30
Payer: COMMERCIAL

## 2021-11-30 VITALS
WEIGHT: 245.8 LBS | BODY MASS INDEX: 36.4 KG/M2 | OXYGEN SATURATION: 99 % | HEIGHT: 69 IN | HEART RATE: 71 BPM | DIASTOLIC BLOOD PRESSURE: 82 MMHG | SYSTOLIC BLOOD PRESSURE: 128 MMHG

## 2021-11-30 DIAGNOSIS — G47.33 OBSTRUCTIVE SLEEP APNEA SYNDROME: Primary | ICD-10-CM

## 2021-11-30 PROCEDURE — G8484 FLU IMMUNIZE NO ADMIN: HCPCS | Performed by: NURSE PRACTITIONER

## 2021-11-30 PROCEDURE — 99213 OFFICE O/P EST LOW 20 MIN: CPT | Performed by: NURSE PRACTITIONER

## 2021-11-30 PROCEDURE — G8427 DOCREV CUR MEDS BY ELIG CLIN: HCPCS | Performed by: NURSE PRACTITIONER

## 2021-11-30 PROCEDURE — G8417 CALC BMI ABV UP PARAM F/U: HCPCS | Performed by: NURSE PRACTITIONER

## 2021-11-30 PROCEDURE — 1036F TOBACCO NON-USER: CPT | Performed by: NURSE PRACTITIONER

## 2021-11-30 ASSESSMENT — ENCOUNTER SYMPTOMS
SHORTNESS OF BREATH: 0
WHEEZING: 0
COUGH: 0
NAUSEA: 0
DIARRHEA: 0
ABDOMINAL PAIN: 0
VOMITING: 0
EYES NEGATIVE: 1

## 2021-11-30 NOTE — PROGRESS NOTES
[facility]      Randa Evans         [id number]  11/30/2021   Chief Complaint   Patient presents with    Follow-up     JOHN 16 month sleep follow up with [facility]         Pt of Dr. Adrian SANTANA Download:   Original or initial AHI: 85.44     Date of initial study: 7/24/12    Repeat titration 4/30/19 AHI 66.7 Weight 278 Lb     Compliant  100%     Noncompliant 0 %     PAP Type airsense 10 Level  12 cmh2o    Avg Hrs/Day 7:29  AHI: 0.4   Recorded compliance dates , 10/24/21-11/22/21   Machine/Mfg:   [x] ResMed    [] Respironics/Dreamstation   Interface:   [] Nasal    [] Nasal pillows   [x] FFM      Provider:      [x] [facility]     [][facility]     [] [facility]    [] [address]    [] [place]               [] [facility]      [] [facility]    [] [address]      [] Other    Neck Size: 17  Mallampati Mallampati 3  ESS:  8  SAQLI: 83    Here is a scan of the most recent download:          Presentation:   Nalini Desouza presents for 16 month sleep medicine follow up for obstructive sleep apnea  Since the last visit, Nalini Desouza is doing well on therapy. No complaints today . Equipment issues: The pressure is  acceptable, the mask is acceptable     Sleep issues:  Do you feel better? Yes  More rested? Yes   Better concentration? yes    Progress History:   Since last visit any new medical issues? No  New ER or hospital visits? No  Any new or changes in medicines? No  Any new sleep medicines? No    Review of Systems -   Review of Systems   Constitutional: Negative for activity change, appetite change, chills, fatigue, fever and unexpected weight change. HENT: Negative. Eyes: Negative. Respiratory: Negative for cough, shortness of breath and wheezing. Cardiovascular: Negative for chest pain, palpitations and leg swelling. Gastrointestinal: Negative for abdominal pain, diarrhea, nausea and vomiting. Genitourinary: Negative. Musculoskeletal: Negative. Skin: Negative.     Neurological: Negative. Hematological: Negative. Psychiatric/Behavioral: Negative. Physical Exam:    BMI:  Body mass index is 36.3 kg/m². Wt Readings from Last 3 Encounters:   11/30/21 245 lb 12.8 oz (111.5 kg)   06/10/20 250 lb (113.4 kg)   12/04/19 239 lb 9.6 oz (108.7 kg)     Weight lost 5 lbs over 16 months   Vitals: /82   Pulse 71   Ht 5' 9\" (1.753 m)   Wt 245 lb 12.8 oz (111.5 kg)   SpO2 99% Comment: r/a  BMI 36.30 kg/m²       Physical Exam  Constitutional:       Appearance: Normal appearance. HENT:      Head: Normocephalic and atraumatic. Eyes:      Conjunctiva/sclera: Conjunctivae normal.   Pulmonary:      Effort: Pulmonary effort is normal. No tachypnea, bradypnea or respiratory distress. Neurological:      Mental Status: He is alert and oriented to person, place, and time. Psychiatric:         Attention and Perception: Attention normal.         Mood and Affect: Mood normal.         Speech: Speech normal.         Behavior: Behavior normal.         Thought Content: Thought content normal.         Cognition and Memory: Cognition normal.         Judgment: Judgment normal.            ASSESSMENT/DIAGNOSIS     Diagnosis Orders   1. Obstructive sleep apnea syndrome  DME Order for CPAP as OP            Plan   Do you need any equipment today? Yes, script for updated supplies   - Download reviewed and discussed with patient  - He  was advised to continue current positive airway pressure therapy with above described pressure. - He  advised to keep good compliance with current recommended pressure to get optimal results and clinical improvement  - Recommend 7-9 hours of sleep with PAP  - He was advised to call DME company regarding supplies if needed.   -He call my office for earlier appointment if needed for worsening of sleep symptoms.   - He was instructed on weight loss  - Sabino Vasques was educated about my impression and plan. Patient verbalizesunderstanding.     We will see David Needs back in: 1 year with download    Total time on encounter: 20 min   Information added by my medical assistant/LPN was reviewed today  Electronically signed by MIK Serra CNP on 11/30/2021 at 4:02 PM